# Patient Record
Sex: FEMALE | Race: WHITE | Employment: OTHER | URBAN - METROPOLITAN AREA
[De-identification: names, ages, dates, MRNs, and addresses within clinical notes are randomized per-mention and may not be internally consistent; named-entity substitution may affect disease eponyms.]

---

## 2017-03-16 ENCOUNTER — TELEPHONE (OUTPATIENT)
Dept: CARDIOLOGY | Facility: CLINIC | Age: 72
End: 2017-03-16

## 2017-03-16 NOTE — TELEPHONE ENCOUNTER
Pt concerned about filling medications she is living in [place]. Informed Pt can fill med's thru pharmacy here that can be used by pharmacy there like [facility]. Pt also concerned about not being able to get back to [facility] for OV. Informed her she should also have provider in [place] in case something should change in her health. DAVID Elias RN

## 2017-05-31 DIAGNOSIS — I25.10 CORONARY ARTERY DISEASE INVOLVING NATIVE CORONARY ARTERY WITHOUT ANGINA PECTORIS: ICD-10-CM

## 2017-05-31 RX ORDER — METOPROLOL SUCCINATE 25 MG/1
25 TABLET, EXTENDED RELEASE ORAL
Qty: 90 TABLET | Refills: 3 | Status: SHIPPED | OUTPATIENT
Start: 2017-05-31

## 2017-07-07 ENCOUNTER — CARE COORDINATION (OUTPATIENT)
Dept: CARDIOLOGY | Facility: CLINIC | Age: 72
End: 2017-07-07

## 2017-07-07 NOTE — PROGRESS NOTES
Patient called and advised that her sons are unable to drive her back for her apt this summer, but advised she does not want to see another cardiologist in [place] as she wants to remain a patient of Dr. Henao. Patient advised she will be able to come for f/u apt in July 2018 (last apt July 2016). Patient wondering if Dr. Henao is willing to extend her medication refills until July 2018. RN advised patient I would notify Dr. Henao of request and call her back with his response. RN did advise patient that it is common practice that we have patients establish care with a provider in their home state if they are planning to maintain permanent residence, but I would notify Dr. Henao of request. Patient acknowledged understanding and agreed with plan.       ADDENDUM: RN called patient and advised her that he prefers she establish care with a PMD to refill her meds and that she sees us in 1 year. Patient acknowledged understanding and advised she has an apt to establish care with a PMD in [place].

## 2017-07-10 NOTE — TELEPHONE ENCOUNTER
I would prefer that she gets refilled by a primary care provider in area of residence and see me in 1 year

## 2017-10-04 DIAGNOSIS — I10 ESSENTIAL HYPERTENSION, BENIGN: ICD-10-CM

## 2017-10-04 RX ORDER — OLMESARTAN MEDOXOMIL AND HYDROCHLOROTHIAZIDE 40/12.5 40; 12.5 MG/1; MG/1
TABLET ORAL
Qty: 90 TABLET | Refills: 3 | OUTPATIENT
Start: 2017-10-04